# Patient Record
Sex: FEMALE | Race: OTHER | URBAN - METROPOLITAN AREA
[De-identification: names, ages, dates, MRNs, and addresses within clinical notes are randomized per-mention and may not be internally consistent; named-entity substitution may affect disease eponyms.]

---

## 2021-07-08 ENCOUNTER — TELEPHONE (OUTPATIENT)
Dept: OBGYN CLINIC | Facility: CLINIC | Age: 26
End: 2021-07-08

## 2021-07-08 NOTE — TELEPHONE ENCOUNTER
Pt is 31 weeks requesting transfer due to having issues at current group and also states she recently moved and [facility] is closer. Pt agrees to rotate PN appts with our 2 male and 4 female physicians. Pt advised of process to review records and determine if transfer is approved or denied. Advised pt to continue care with current group for now. Pt agrees. States she already has the clinic fax#.

## 2021-07-08 NOTE — TELEPHONE ENCOUNTER
Patient calling wanting to transfer over to [facility].  Patient is 31 weeks pregnant and informed would need records sent for OB to determine acceptance    Please contact

## 2022-06-01 ENCOUNTER — APPOINTMENT (OUTPATIENT)
Dept: URBAN - METROPOLITAN AREA CLINIC 244 | Age: 27
Setting detail: DERMATOLOGY
End: 2022-06-01

## 2022-06-01 DIAGNOSIS — L30.9 DERMATITIS, UNSPECIFIED: ICD-10-CM

## 2022-06-01 PROCEDURE — OTHER COUNSELING: OTHER

## 2022-06-01 PROCEDURE — OTHER PRESCRIPTION: OTHER

## 2022-06-01 PROCEDURE — 99204 OFFICE O/P NEW MOD 45 MIN: CPT

## 2022-06-01 RX ORDER — HYDROCORTISONE 25 MG/G
CREAM TOPICAL
Qty: 454 | Refills: 0 | Status: ERX | COMMUNITY
Start: 2022-06-01

## 2025-04-17 NOTE — ED INITIAL ASSESSMENT (HPI)
PT states hit her rt foot 5th digit on the metal part of the tub this morning. Thinks she broke it.

## 2025-04-17 NOTE — ED PROVIDER NOTES
Patient Seen in: [facility] Emergency Department      History     Chief Complaint   Patient presents with    Leg or Foot Injury     Stated Complaint: Toe injury    Subjective:   HPI    Patient presents the emergency department after injuring her right fifth toe.  She states that this morning she excellently kicked the metal frame of a shower.  She has dull throbbing aching pain and bruising of the small toe.  There is no other injuries.  History of Present Illness               Objective:     History reviewed. No pertinent past medical history.           Past Surgical History:   Procedure Laterality Date    Removal gallbladder                  Social History     Socioeconomic History    Marital status:    Tobacco Use    Smoking status: Never    Smokeless tobacco: Never   Vaping Use    Vaping status: Never Used   Substance and Sexual Activity    Alcohol use: Yes     Comment: occ    Drug use: Never     Social Drivers of Health      Received from [facility]                                Physical Exam     ED Triage Vitals [04/17/25 1509]   BP (!) 138/95   Pulse 92   Resp 20   Temp 98 °F (36.7 °C)   Temp src Temporal   SpO2 97 %   O2 Device None (Room air)       Current Vitals:   Vital Signs  BP: (!) 138/95  Pulse: 92  Resp: 20  Temp: 98 °F (36.7 °C)  Temp src: Temporal    Oxygen Therapy  SpO2: 97 %  O2 Device: None (Room air)        Physical Exam  Vitals and nursing note reviewed.   Constitutional:       General: She is not in acute distress.     Appearance: She is well-developed.   HENT:      Head: Normocephalic.      Nose: Nose normal.      Mouth/Throat:      Mouth: Mucous membranes are moist.   Eyes:      Conjunctiva/sclera: Conjunctivae normal.   Cardiovascular:      Rate and Rhythm: Normal rate and regular rhythm.      Heart sounds: No murmur heard.  Pulmonary:      Effort: Pulmonary effort is normal. No respiratory distress.      Breath sounds: Normal breath sounds.   Abdominal:       General: There is no distension.      Palpations: Abdomen is soft.      Tenderness: There is no abdominal tenderness.   Musculoskeletal:      Cervical back: Normal range of motion and neck supple.      Comments: The right foot was examined.  There is swelling and bruising and pain to the right fifth toe.  Capillary fill brisk and less than 2 seconds.  No deformity.   Skin:     General: Skin is warm and dry.      Findings: No rash.   Neurological:      Mental Status: She is alert and oriented to person, place, and time.           Physical Exam                ED Course   Labs Reviewed - No data to display       Results                                 MDM              Medical Decision Making  Differential diagnosis for fracture, dislocation, contusion.    Amount and/or Complexity of Data Reviewed  Radiology: ordered and independent interpretation performed. Decision-making details documented in ED Course.     Details: X-ray shows nondisplaced fracture of the fifth toe.  Discussion of management or test interpretation with external provider(s): Rip tape of the toes performed at the technician.  CMS intact after placement.  Follow-up with podiatry.        Disposition and Plan     Clinical Impression:  1. Closed nondisplaced fracture of phalanx of lesser toe of right foot, unspecified phalanx, initial encounter         Disposition:  Discharge  4/17/2025  4:31 pm    Follow-up:  Luis Wayne, DPM  [id number]  [facility] [address] [phone]    Schedule an appointment as soon as possible for a visit            Medications Prescribed:  There are no discharge medications for this patient.      Supplementary Documentation:

## (undated) NOTE — LETTER
Date & Time: 4/17/2025, 5:28 PM  Patient: Jeanine Almonte  Encounter Provider(s):    Casper Carlos MD       To Whom It May Concern:    Jeanine Almonte was seen and treated in our department on 4/17/2025. She should not return to work until 4/21/2025 .    If you have any questions or concerns, please do not hesitate to call.        _____________________________  Physician/APC Signature

## (undated) NOTE — LETTER
Date & Time: 4/17/2025, 4:31 PM  Patient: Jeanine Almonte  Encounter Provider(s):    Casper Carlos MD       To Whom It May Concern:    Jeanine Almonte was seen and treated in our department on 4/17/2025. She should not return to work until 4/18/2025 .    If you have any questions or concerns, please do not hesitate to call.        _____________________________  Physician/APC Signature